# Patient Record
Sex: FEMALE | URBAN - METROPOLITAN AREA
[De-identification: names, ages, dates, MRNs, and addresses within clinical notes are randomized per-mention and may not be internally consistent; named-entity substitution may affect disease eponyms.]

---

## 2022-03-04 ENCOUNTER — APPOINTMENT (OUTPATIENT)
Dept: URBAN - METROPOLITAN AREA CLINIC 320 | Age: 42
Setting detail: DERMATOLOGY
End: 2022-03-11

## 2022-03-04 DIAGNOSIS — Z41.9 ENCOUNTER FOR PROCEDURE FOR PURPOSES OTHER THAN REMEDYING HEALTH STATE, UNSPECIFIED: ICD-10-CM

## 2022-03-04 PROCEDURE — OTHER COSMETIC CONSULTATION: BOTULINUM TOXIN: OTHER

## 2022-03-04 PROCEDURE — OTHER BOTOX (U OR CC): OTHER

## 2022-03-04 PROCEDURE — OTHER TREATMENT REGIMEN: OTHER

## 2022-03-04 ASSESSMENT — LOCATION SIMPLE DESCRIPTION DERM
LOCATION SIMPLE: LEFT NOSE
LOCATION SIMPLE: RIGHT FOREHEAD
LOCATION SIMPLE: LEFT INFERIOR EYELID
LOCATION SIMPLE: LEFT TEMPLE
LOCATION SIMPLE: NOSE
LOCATION SIMPLE: RIGHT EYELID
LOCATION SIMPLE: RIGHT EYEBROW
LOCATION SIMPLE: LEFT EYEBROW
LOCATION SIMPLE: RIGHT INFERIOR EYELID
LOCATION SIMPLE: RIGHT CHEEK
LOCATION SIMPLE: LEFT FOREHEAD
LOCATION SIMPLE: LEFT CHEEK
LOCATION SIMPLE: SUPERIOR FOREHEAD

## 2022-03-04 ASSESSMENT — LOCATION DETAILED DESCRIPTION DERM
LOCATION DETAILED: RIGHT LATERAL EYEBROW
LOCATION DETAILED: NASAL DORSUM
LOCATION DETAILED: LEFT SUPERIOR LATERAL MALAR CHEEK
LOCATION DETAILED: RIGHT LATERAL CANTHUS
LOCATION DETAILED: LEFT FOREHEAD
LOCATION DETAILED: RIGHT MEDIAL INFERIOR EYELID
LOCATION DETAILED: LEFT LATERAL INFERIOR EYELID
LOCATION DETAILED: LEFT LATERAL EYEBROW
LOCATION DETAILED: LEFT INFERIOR TEMPLE
LOCATION DETAILED: RIGHT SUPERIOR LATERAL MALAR CHEEK
LOCATION DETAILED: LEFT MID TEMPLE
LOCATION DETAILED: RIGHT SUPERIOR CENTRAL MALAR CHEEK
LOCATION DETAILED: LEFT NASAL SIDEWALL
LOCATION DETAILED: SUPERIOR MID FOREHEAD
LOCATION DETAILED: RIGHT FOREHEAD

## 2022-03-04 ASSESSMENT — LOCATION ZONE DERM
LOCATION ZONE: FACE
LOCATION ZONE: NOSE
LOCATION ZONE: EYELID

## 2022-03-04 NOTE — PROCEDURE: BOTOX (U OR CC)
Quantity Per Injection Site (Units Or Cc): 1 U
Quantity Per Injection Site (Units Or Cc): 2 U
Detail Level: Detailed
Forehead Units/Cc: 6
Post-Care Instructions: Patient instructed to not lie down for 4 hours and limit physical activity for 24 hours.
Quantity Per Injection Site (Units Or Cc): 4 U
Additional Area 2 Units: 0
Consent: Written consent obtained. Risks include but not limited to lid/brow ptosis, bruising, swelling, diplopia, temporary effect, incomplete chemical denervation.
Inferior Lateral Orbicularis Oculi Units: 2
Document As Units Or Cc?: units
Reconstitution Date (Optional): charged as 1 regular 1 small area
Nasal Root Units/Cc: 4
Including Pricing Information In The Note: No
Periorbital Skin Units/Cc: 24

## 2022-04-08 ENCOUNTER — APPOINTMENT (OUTPATIENT)
Dept: URBAN - METROPOLITAN AREA CLINIC 320 | Age: 42
Setting detail: DERMATOLOGY
End: 2022-04-08

## 2022-04-08 DIAGNOSIS — Z41.9 ENCOUNTER FOR PROCEDURE FOR PURPOSES OTHER THAN REMEDYING HEALTH STATE, UNSPECIFIED: ICD-10-CM

## 2022-04-08 PROCEDURE — OTHER SECRET RF: OTHER

## 2022-04-08 NOTE — PROCEDURE: SECRET RF
Tip: 24-Insulated
Depth In Microns: 0.5
Passes: 0
Tip: 65-Semi-Insulated
Detail Level: Generalized
Consent: Written consent obtained, risks reviewed including but not limited to darker or lighter pigmentary change, and/or incomplete improvement.
Render Post-Care In The Note: Yes
Post-Care Instructions: I reviewed with the patient in detail post-care instructions. Patient should apply oil free moisturizer daily until fully healed.  Patient should:\\n-wait at least 4 hours before washing face or applying soap, cream or makeup to the treatment area\\n-avoid excessive heat such as hot tubs, saunas etc for 1-2 days\\n-avoid excessive sun exposure for 1-2 days after treatment and wear an oil free broad spectrum sunscreen with broad brimmed hat if planning on being outdoors\\n-avoid products containing tretinoin, retinol, benzoyl peroxide, glycolic/salicylic acids, vitamin-C/ascorbic acid, astringents etc for a few days\\n-Bruising, redness and swelling may occur and resolve with time
Comments: TREATMENT AREA\\n\\n+FOREHEAD, NOSE, PERIORBITAL\\nIntensity: 30%\\n1st Pass: \\n120ms, 1.2mm deep \\n2nd Pass: \\n120ms, 1.2mm deep\\n\\n+CHEEKS, JAWLINE\\nIntensity: 30%\\n1st Pass: \\n200ms, 2mm deep \\n2nd Pass: \\n150ms, 1.5mm deep\\n\\n+DEEP ACNE SCARS\\nIntensity: 30%\\n1st Pass: \\n300ms, 3mm deep \\n2nd Pass: \\n200ms, 2mm deep\\n3rd Pass (optional)\\n150ms, 1.5mm
Indication: Acne Scars
Length Of Topical Anesthesia Application (Optional): 60 minutes
Topical Anesthesia?: BLT cream (benzocaine 10%, lidocaine 4%, tetracaine 2%)
Treatment Area: face
Use Distraction Techniques In Note: No
Treatment Number: 1

## 2022-04-14 ENCOUNTER — APPOINTMENT (OUTPATIENT)
Dept: URBAN - METROPOLITAN AREA CLINIC 320 | Age: 42
Setting detail: DERMATOLOGY
End: 2022-06-10

## 2022-04-14 DIAGNOSIS — Z41.9 ENCOUNTER FOR PROCEDURE FOR PURPOSES OTHER THAN REMEDYING HEALTH STATE, UNSPECIFIED: ICD-10-CM

## 2022-04-14 PROCEDURE — OTHER BOTOX: OTHER

## 2022-04-14 ASSESSMENT — LOCATION DETAILED DESCRIPTION DERM
LOCATION DETAILED: LEFT LATERAL FOREHEAD
LOCATION DETAILED: RIGHT LATERAL FOREHEAD
LOCATION DETAILED: LEFT SUPERIOR FOREHEAD
LOCATION DETAILED: RIGHT SUPERIOR FOREHEAD
LOCATION DETAILED: LEFT FOREHEAD
LOCATION DETAILED: SUPERIOR MID FOREHEAD
LOCATION DETAILED: RIGHT FOREHEAD
LOCATION DETAILED: LEFT SUPERIOR LATERAL FOREHEAD

## 2022-04-14 ASSESSMENT — LOCATION SIMPLE DESCRIPTION DERM
LOCATION SIMPLE: LEFT FOREHEAD
LOCATION SIMPLE: RIGHT FOREHEAD
LOCATION SIMPLE: SUPERIOR FOREHEAD

## 2022-04-14 ASSESSMENT — LOCATION ZONE DERM: LOCATION ZONE: FACE

## 2022-05-05 ENCOUNTER — APPOINTMENT (OUTPATIENT)
Dept: URBAN - METROPOLITAN AREA CLINIC 320 | Age: 42
Setting detail: DERMATOLOGY
End: 2022-05-20

## 2022-05-05 DIAGNOSIS — Z41.9 ENCOUNTER FOR PROCEDURE FOR PURPOSES OTHER THAN REMEDYING HEALTH STATE, UNSPECIFIED: ICD-10-CM

## 2022-05-05 PROCEDURE — OTHER BOTOX: OTHER

## 2022-05-05 ASSESSMENT — LOCATION SIMPLE DESCRIPTION DERM
LOCATION SIMPLE: RIGHT TEMPLE
LOCATION SIMPLE: LEFT NOSE
LOCATION SIMPLE: LEFT TEMPLE
LOCATION SIMPLE: RIGHT NOSE
LOCATION SIMPLE: RIGHT CHEEK
LOCATION SIMPLE: RIGHT EYEBROW
LOCATION SIMPLE: LEFT CHEEK

## 2022-05-05 ASSESSMENT — LOCATION DETAILED DESCRIPTION DERM
LOCATION DETAILED: LEFT SUPERIOR LATERAL MALAR CHEEK
LOCATION DETAILED: RIGHT SUPERIOR LATERAL MALAR CHEEK
LOCATION DETAILED: LEFT MID TEMPLE
LOCATION DETAILED: RIGHT INFERIOR TEMPLE
LOCATION DETAILED: RIGHT NASAL SIDEWALL
LOCATION DETAILED: RIGHT MID TEMPLE
LOCATION DETAILED: LEFT INFERIOR TEMPLE
LOCATION DETAILED: LEFT NASAL SIDEWALL
LOCATION DETAILED: RIGHT LATERAL EYEBROW

## 2022-05-05 ASSESSMENT — LOCATION ZONE DERM
LOCATION ZONE: NOSE
LOCATION ZONE: FACE

## 2022-05-13 ENCOUNTER — APPOINTMENT (OUTPATIENT)
Dept: URBAN - METROPOLITAN AREA CLINIC 320 | Age: 42
Setting detail: DERMATOLOGY
End: 2022-05-13

## 2022-05-13 DIAGNOSIS — Z41.9 ENCOUNTER FOR PROCEDURE FOR PURPOSES OTHER THAN REMEDYING HEALTH STATE, UNSPECIFIED: ICD-10-CM

## 2022-05-13 PROCEDURE — OTHER PHOTO-DOCUMENTATION: OTHER

## 2022-05-13 PROCEDURE — OTHER SECRET RF: OTHER

## 2022-05-13 NOTE — PROCEDURE: SECRET RF
Passes: 0
Tip: 24-Insulated
Depth In Microns: 0.5
Render Post-Care In The Note: Yes
Length Of Topical Anesthesia Application (Optional): 60 minutes
Comments: TREATMENT AREA\\n\\n+FOREHEAD, NOSE, PERIORBITAL+\\nIntensity: 40-50%****\\n1st Pass:\\n120ms, 1.2mm deep\\n2nd Pass:\\n120ms, 1.2mm deep\\nNO THIRD PASS\\n\\n+CHEEKS, JAWLINE+\\nIntensity: 40-50%****\\n1st Pass:\\n200ms, 2mm deep\\n2nd Pass:\\n150ms, 1.5mm deep\\n3rd Pass:  (Optional)\\n120ms, 1.2mm\\n\\n+DEEP ACNE SCARS+\\nIntensity: 40-50%****\\n1st Pass:\\n300ms, 3mm deep\\n2nd Pass:\\n200ms, 2mm deep\\n3rd Pass (optional)\\n150ms, 1.5mm
Treatment Number: 2
Consent: Written consent obtained, risks reviewed including but not limited to darker or lighter pigmentary change, and/or incomplete improvement.
Topical Anesthesia?: BLT cream (benzocaine 10%, lidocaine 4%, tetracaine 2%)
Tip: 65-Semi-Insulated
Use Distraction Techniques In Note: No
Indication: Acne Scars
Treatment Area: face
Detail Level: Generalized
Post-Care Instructions: I reviewed with the patient in detail post-care instructions. Patient should apply oil free moisturizer daily until fully healed.  Patient should:\\n-wait at least 4 hours before washing face or applying soap, cream or makeup to the treatment area\\n-avoid excessive heat such as hot tubs, saunas etc for 1-2 days\\n-avoid excessive sun exposure for 1-2 days after treatment and wear an oil free broad spectrum sunscreen with broad brimmed hat if planning on being outdoors\\n-avoid products containing tretinoin, retinol, benzoyl peroxide, glycolic/salicylic acids, vitamin-C/ascorbic acid, astringents etc for a few days\\n-Bruising, redness and swelling may occur and resolve with time

## 2022-05-13 NOTE — PROCEDURE: PHOTO-DOCUMENTATION
Detail Level: Zone
Photo Preface (Leave Blank If You Do Not Want): High Definition Pictures taken today

## 2024-05-09 NOTE — PROCEDURE: TREATMENT REGIMEN
Wife [place] translation.  [phone]  
Plan: Good candidate for radio frequency micro needling secret RF 2-3 sessions month apart face and neck.
Detail Level: Zone